# Patient Record
Sex: FEMALE | Race: WHITE | NOT HISPANIC OR LATINO
[De-identification: names, ages, dates, MRNs, and addresses within clinical notes are randomized per-mention and may not be internally consistent; named-entity substitution may affect disease eponyms.]

---

## 2019-03-22 ENCOUNTER — APPOINTMENT (OUTPATIENT)
Dept: BARIATRICS | Facility: CLINIC | Age: 54
End: 2019-03-22
Payer: COMMERCIAL

## 2019-03-22 ENCOUNTER — TRANSCRIPTION ENCOUNTER (OUTPATIENT)
Age: 54
End: 2019-03-22

## 2019-03-22 VITALS
WEIGHT: 207.8 LBS | HEIGHT: 65.5 IN | HEART RATE: 78 BPM | DIASTOLIC BLOOD PRESSURE: 86 MMHG | BODY MASS INDEX: 34.21 KG/M2 | SYSTOLIC BLOOD PRESSURE: 138 MMHG

## 2019-03-22 PROCEDURE — 99213 OFFICE O/P EST LOW 20 MIN: CPT

## 2019-03-22 NOTE — HISTORY OF PRESENT ILLNESS
[de-identified] : 5 years s/p revision to functional esophagojejunostomy. She is gaining approximately 10 lbs per year. From our standpoint labs are fine.

## 2019-03-22 NOTE — ASSESSMENT
[FreeTextEntry1] : Will refer to medical weight lose program to try to hault recidivism. \par Follow-up with Cinthya De La Cruz.

## 2019-04-05 ENCOUNTER — APPOINTMENT (OUTPATIENT)
Dept: BARIATRICS | Facility: CLINIC | Age: 54
End: 2019-04-05
Payer: COMMERCIAL

## 2019-04-05 VITALS
HEIGHT: 65.5 IN | BODY MASS INDEX: 34.07 KG/M2 | SYSTOLIC BLOOD PRESSURE: 171 MMHG | DIASTOLIC BLOOD PRESSURE: 95 MMHG | HEART RATE: 71 BPM | WEIGHT: 207 LBS

## 2019-04-05 DIAGNOSIS — Z82.49 FAMILY HISTORY OF ISCHEMIC HEART DISEASE AND OTHER DISEASES OF THE CIRCULATORY SYSTEM: ICD-10-CM

## 2019-04-05 DIAGNOSIS — Z86.79 PERSONAL HISTORY OF OTHER DISEASES OF THE CIRCULATORY SYSTEM: ICD-10-CM

## 2019-04-05 DIAGNOSIS — Z86.69 PERSONAL HISTORY OF OTHER DISEASES OF THE NERVOUS SYSTEM AND SENSE ORGANS: ICD-10-CM

## 2019-04-05 DIAGNOSIS — Z87.42 PERSONAL HISTORY OF OTHER DISEASES OF THE FEMALE GENITAL TRACT: ICD-10-CM

## 2019-04-05 DIAGNOSIS — Z86.59 PERSONAL HISTORY OF OTHER MENTAL AND BEHAVIORAL DISORDERS: ICD-10-CM

## 2019-04-05 DIAGNOSIS — Z87.898 PERSONAL HISTORY OF OTHER SPECIFIED CONDITIONS: ICD-10-CM

## 2019-04-05 DIAGNOSIS — Z82.3 FAMILY HISTORY OF STROKE: ICD-10-CM

## 2019-04-05 PROCEDURE — 99204 OFFICE O/P NEW MOD 45 MIN: CPT

## 2019-04-05 RX ORDER — CARBIDOPA AND LEVODOPA 25; 100 MG/1; MG/1
25-100 TABLET ORAL
Refills: 0 | Status: ACTIVE | COMMUNITY

## 2019-04-05 RX ORDER — METOPROLOL SUCCINATE 25 MG/1
25 TABLET, EXTENDED RELEASE ORAL
Refills: 0 | Status: ACTIVE | COMMUNITY

## 2019-04-05 RX ORDER — ATORVASTATIN CALCIUM 10 MG/1
10 TABLET, FILM COATED ORAL
Refills: 0 | Status: ACTIVE | COMMUNITY

## 2019-04-05 NOTE — REASON FOR VISIT
[Initial Consultation] : an initial consultation for [Hyperlipidemia] : hyperlipidemia [Hypertension] : hypertension [Obesity] : obesity

## 2019-04-05 NOTE — HISTORY OF PRESENT ILLNESS
[FreeTextEntry1] : 54 year old female for medical weight loss consultation.  Patient had a lap band,  then sleeve gastrectomy with malabsorption then esophoagojejunostomy in 2014.  Now with weight gain after surgery.  Had severe malaborption in the past requiring multiple endoscopies, stenting, and feeding tube placement which has all been resolved.  \par Highest adult weight 265 prior to lap band\par Lowerst adult weight 145 during period of malabsorption\par Feels the best when she is 165 pounds, has been gaining about 10 pounds a year\par Had very low food intake during high school but denies anorexia nervosa.  \par Starting gaining weight in her 20's when she started eating more\par Going to have a cervical fusion in May \par Typical breakfast- cheese and cold cuts, Lunch- salad and chicken Dinner fish/chicken grilled, vegetables, Snack- fruit, pretzels/chips/crackers.  \par Notices feeling of hypoglycemia after she eats carbohydrates and increased hunger does not check her BG levels\par Motivation- fitting into her clothes, knee pain \par Drinks about 4 glass/day of water\par Sleep- wakes up overnight often, takes melatonin prn \par No exercise does enjoy walking

## 2019-04-05 NOTE — ASSESSMENT
[FreeTextEntry1] : 54 year old female with obesity and reported hypoglycemia\par Recent labs from 3/13/19 reviewed, will check TSH, dex suppresion test, fasting c-peptide levels\par Will prescribe glucometer to verify true hypoglycemia during symptomatic episodes\par Can consider weight loss medications but would avoid phentemine containing meds, and would use caution with blood sugar altering medications.  Could be a good candidate for contrave or topiramate off label.  \par Patient's HTN is uncontrolled- she will f/u with her cardiologist.  States it has been better controlled lately when it has been tested.  \par Behavioral changes- increase water intake, take melatonin more as needed for better sleep, walk 3x a week\par Declines visit with RD at this time\par Will f/u in 2 weeks

## 2019-04-19 ENCOUNTER — APPOINTMENT (OUTPATIENT)
Dept: BARIATRICS | Facility: CLINIC | Age: 54
End: 2019-04-19
Payer: COMMERCIAL

## 2019-04-19 VITALS
HEART RATE: 66 BPM | HEIGHT: 65.5 IN | SYSTOLIC BLOOD PRESSURE: 143 MMHG | DIASTOLIC BLOOD PRESSURE: 91 MMHG | WEIGHT: 205 LBS | BODY MASS INDEX: 33.75 KG/M2

## 2019-04-19 PROCEDURE — 99214 OFFICE O/P EST MOD 30 MIN: CPT

## 2019-04-19 RX ORDER — ROPINIROLE 2 MG/1
2 TABLET, FILM COATED ORAL
Refills: 0 | Status: ACTIVE | COMMUNITY

## 2019-04-19 RX ORDER — FLUTICASONE PROPIONATE 50 UG/1
50 SPRAY, METERED NASAL
Refills: 0 | Status: ACTIVE | COMMUNITY

## 2019-04-19 RX ORDER — DEXAMETHASONE 1 MG/1
1 TABLET ORAL
Qty: 1 | Refills: 0 | Status: COMPLETED | COMMUNITY
Start: 2019-04-05 | End: 2019-04-19

## 2019-04-19 NOTE — ASSESSMENT
[FreeTextEntry1] : Patient taught how to use glucometer during office visit today.  Reviewed s/s/tx of hypoglycemia BG<70mg/dL.  \par Patient will continue to work on goals of better sleep, exercise, and good nutrition.  She is limited in nutrition because she reports very low intake due to GI surgical history.  She is limited in exercise until after her cervical fusion.  \par We will defer starting her on contrave today as she is going to need opoid pain medication after her surgery\par She will f/u with me in approximately 8 weeks, 4 weeks after her surgery, for medication initiation

## 2019-04-19 NOTE — HISTORY OF PRESENT ILLNESS
[FreeTextEntry1] : 54 year old female for medical weight loss consultation.  Patient had a lap band,  then sleeve gastrectomy with malabsorption then esophoagojejunostomy in 2014.  Now with weight gain after surgery.  Had severe malaborption in the past requiring multiple endoscopies, stenting, and feeding tube placement which has all been resolved.  \par Highest adult weight 265 prior to lap band\par Lowerst adult weight 145 during period of malabsorption\par Feels the best when she is 165 pounds, has been gaining about 10 pounds a year\par Had very low food intake during high school but denies anorexia nervosa.  \par Starting gaining weight in her 20's when she started eating more\par Going to have a cervical fusion in May \par Typical breakfast- cheese and cold cuts, Lunch- salad and chicken Dinner fish/chicken grilled, vegetables, Snack- fruit, pretzels/chips/crackers.  \par Notices feeling of hypoglycemia after she eats carbohydrates and increased hunger does not check her BG levels\par Motivation- fitting into her clothes, knee pain \par Drinks about 4 glass/day of water\par Sleep- wakes up overnight often, takes melatonin prn \par No exercise does enjoy walking \par \par 4/19/19- Patient feeling well.  Loss 2 pounds.  Taking melatonin which has helped both her and her  get better sleep.  Not able to drink more water due to excessive fullness, doing yardwork but no aerobic exercise.  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

## 2019-08-02 ENCOUNTER — RX RENEWAL (OUTPATIENT)
Age: 54
End: 2019-08-02

## 2019-08-02 RX ORDER — BLOOD SUGAR DIAGNOSTIC
STRIP MISCELLANEOUS DAILY
Qty: 1 | Refills: 2 | Status: ACTIVE | COMMUNITY
Start: 2019-08-02 | End: 1900-01-01

## 2019-08-22 ENCOUNTER — APPOINTMENT (OUTPATIENT)
Dept: BARIATRICS | Facility: CLINIC | Age: 54
End: 2019-08-22

## 2019-10-03 ENCOUNTER — APPOINTMENT (OUTPATIENT)
Dept: BARIATRICS | Facility: CLINIC | Age: 54
End: 2019-10-03
Payer: COMMERCIAL

## 2019-10-03 VITALS
HEART RATE: 62 BPM | SYSTOLIC BLOOD PRESSURE: 149 MMHG | DIASTOLIC BLOOD PRESSURE: 96 MMHG | WEIGHT: 208 LBS | BODY MASS INDEX: 34.09 KG/M2

## 2019-10-03 DIAGNOSIS — E16.2 HYPOGLYCEMIA, UNSPECIFIED: ICD-10-CM

## 2019-10-03 PROCEDURE — 99214 OFFICE O/P EST MOD 30 MIN: CPT

## 2019-10-03 RX ORDER — FLASH GLUCOSE SENSOR
KIT MISCELLANEOUS
Qty: 1 | Refills: 5 | Status: ACTIVE | COMMUNITY
Start: 2019-10-03 | End: 1900-01-01

## 2019-10-03 NOTE — HISTORY OF PRESENT ILLNESS
[FreeTextEntry1] : 54 year old female for medical weight loss consultation.  Patient had a lap band,  then sleeve gastrectomy with malabsorption then esophoagojejunostomy in 2014.  Now with weight gain after surgery.  Had severe malaborption in the past requiring multiple endoscopies, stenting, and feeding tube placement which has all been resolved.  \par Highest adult weight 265 prior to lap band\par Lowerst adult weight 145 during period of malabsorption\par Feels the best when she is 165 pounds, has been gaining about 10 pounds a year\par Had very low food intake during high school but denies anorexia nervosa.  \par Starting gaining weight in her 20's when she started eating more\par Going to have a cervical fusion in May \par Typical breakfast- cheese and cold cuts, Lunch- salad and chicken Dinner fish/chicken grilled, vegetables, Snack- fruit, pretzels/chips/crackers.  \par Notices feeling of hypoglycemia after she eats carbohydrates and increased hunger does not check her BG levels\par Motivation- fitting into her clothes, knee pain \par Drinks about 4 glass/day of water\par Sleep- wakes up overnight often, takes melatonin prn \par No exercise does enjoy walking \par \par 4/19/19- Patient feeling well.  Loss 2 pounds.  Taking melatonin which has helped both her and her  get better sleep.  Not able to drink more water due to excessive fullness, doing yardwork but no aerobic exercise.  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  \par \par 10/3/19- Patient RTO feeling well.  Had cervical spine surgery- complications during recovery of swelling requiring 5 day hospitalization and steroids.  Now doing PT.  Gained 2 pounds total.  Started cymbalta which has been helping mood and pain.  Checking BG PRN and noticing severe hypoglycemia- BG 20,31 +awareness treating with glucose tablets.

## 2019-10-03 NOTE — ASSESSMENT
[FreeTextEntry1] : Encouraged patient to begin exercising 3x weekly\par Will keep food dairy and see RD\par Advised to avoid simple carbs which are likely contributing to hypoglycemia.  Encouraged protein intake.\par Will prescribe freestyle anamika and can consider dexcom in the future but unlikely insurance will cover it\par Weight loss medications contraindicated at this time- (Contrave interacts with cymbalta, phentemine/qsymia with parkinsons and HTN, metformin/saxenda with hypglycemia, belviq with hypoglycemia)\par Patient to f/u with GI or Dr. Oliva to discussed GERD symptoms

## 2019-10-17 ENCOUNTER — TRANSCRIPTION ENCOUNTER (OUTPATIENT)
Age: 54
End: 2019-10-17

## 2019-10-24 RX ORDER — DULOXETINE HYDROCHLORIDE 30 MG/1
30 CAPSULE, DELAYED RELEASE ORAL
Refills: 0 | Status: ACTIVE | COMMUNITY

## 2019-11-22 ENCOUNTER — APPOINTMENT (OUTPATIENT)
Dept: BARIATRICS | Facility: CLINIC | Age: 54
End: 2019-11-22
Payer: COMMERCIAL

## 2019-11-22 PROCEDURE — 97802 MEDICAL NUTRITION INDIV IN: CPT

## 2020-02-19 DIAGNOSIS — R63.5 ABNORMAL WEIGHT GAIN: ICD-10-CM
